# Patient Record
Sex: MALE | Race: WHITE | NOT HISPANIC OR LATINO
[De-identification: names, ages, dates, MRNs, and addresses within clinical notes are randomized per-mention and may not be internally consistent; named-entity substitution may affect disease eponyms.]

---

## 2021-10-26 ENCOUNTER — APPOINTMENT (OUTPATIENT)
Dept: SURGERY | Facility: CLINIC | Age: 51
End: 2021-10-26
Payer: MEDICARE

## 2021-10-26 VITALS — WEIGHT: 166 LBS

## 2021-10-26 DIAGNOSIS — I35.1 NONRHEUMATIC AORTIC (VALVE) INSUFFICIENCY: ICD-10-CM

## 2021-10-26 DIAGNOSIS — H33.22 SEROUS RETINAL DETACHMENT, LEFT EYE: ICD-10-CM

## 2021-10-26 DIAGNOSIS — H52.11 MYOPIA, RIGHT EYE: ICD-10-CM

## 2021-10-26 DIAGNOSIS — Z87.2 PERSONAL HISTORY OF DISEASES OF THE SKIN AND SUBCUTANEOUS TISSUE: ICD-10-CM

## 2021-10-26 DIAGNOSIS — Z78.9 OTHER SPECIFIED HEALTH STATUS: ICD-10-CM

## 2021-10-26 PROCEDURE — 99203 OFFICE O/P NEW LOW 30 MIN: CPT

## 2021-10-26 RX ORDER — SODIUM CHLORIDE 0.65 %
0.65 AEROSOL, SPRAY (ML) NASAL
Refills: 0 | Status: ACTIVE | COMMUNITY

## 2021-10-26 RX ORDER — DOCUSATE SODIUM 100 MG/1
100 CAPSULE, LIQUID FILLED ORAL
Refills: 0 | Status: ACTIVE | COMMUNITY

## 2021-10-26 RX ORDER — ADHESIVE TAPE 3"X 2.3 YD
50 MCG TAPE, NON-MEDICATED TOPICAL
Refills: 0 | Status: ACTIVE | COMMUNITY

## 2021-10-26 RX ORDER — MENTHOL
40 GEL (GRAM) TOPICAL
Refills: 0 | Status: ACTIVE | COMMUNITY

## 2021-10-26 RX ORDER — PETROLATUM 76 G/100G
OINTMENT TOPICAL
Refills: 0 | Status: ACTIVE | COMMUNITY

## 2021-10-26 RX ORDER — LORATADINE 10 MG/1
10 TABLET ORAL
Refills: 0 | Status: ACTIVE | COMMUNITY

## 2021-10-26 RX ORDER — MICONAZOLE NITRATE 20 MG/G
2 CREAM TOPICAL
Refills: 0 | Status: ACTIVE | COMMUNITY

## 2021-10-26 RX ORDER — KETOCONAZOLE 20.5 MG/ML
2 SHAMPOO, SUSPENSION TOPICAL
Refills: 0 | Status: ACTIVE | COMMUNITY

## 2021-10-26 RX ORDER — OLOPATADINE HYDROCHLORIDE OPHTHALMIC 2 MG/ML
0.2 SOLUTION OPHTHALMIC
Refills: 0 | Status: ACTIVE | COMMUNITY

## 2021-10-26 RX ORDER — LACTULOSE 10 G/10G
10 SOLUTION ORAL
Refills: 0 | Status: ACTIVE | COMMUNITY

## 2021-10-26 RX ORDER — MONTELUKAST 10 MG/1
10 TABLET, FILM COATED ORAL
Refills: 0 | Status: ACTIVE | COMMUNITY

## 2021-10-26 RX ORDER — SENNOSIDES 8.6 MG TABLETS 8.6 MG/1
8.6 TABLET ORAL
Refills: 0 | Status: ACTIVE | COMMUNITY

## 2021-10-26 RX ORDER — LORAZEPAM 2 MG/1
2 TABLET ORAL
Refills: 0 | Status: ACTIVE | COMMUNITY

## 2021-10-26 NOTE — ASSESSMENT
[FreeTextEntry1] : Family physician: Dr. Crum\par Date: 10/26/2021\par re referral chronically incarcerated right inguinal scrotal hernia\par \par Is a 42-year-old mentally challenged gentleman who was initially evaluated by his primary care physician and noted to have a chronically incarcerated right inguinal hernia with extension into the mid scrotum.  There went a CAT scan of the abdomen pelvis that demonstrated a right inguinal hernia with redundant sigmoid colon that extends into the internal ring.  Patient has been referred here for further evaluation and management.  Patient unable to give history.\par \par Physical examination patient has a chronically incarcerated right inguinal hernia.  It extends into the mid scrotum.  It is not reducible in the supine position.  Left side is difficult to examine given this patient's amount of fat in the suprapubic groin regions.  There is a fair amount of laxity noted in the left groin and a small left inguinal hernia cannot be ruled out.  Scrotum and testicles appear to be within normal limits.\par \par Impression/plan, chronically incarcerated right inguinal scrotal hernia, suspect left inguinal hernia: This is a 50-year-old gentleman that is mentally challenged.  He clearly has an obvious right inguinal scrotal hernia.  Given the fact that he is mentally challenged I think this hernia should be repaired even though I am not convinced he is symptomatic with this hernia.  I suspect he also has a hernia on the contralateral side as well given the amount of laxity noted on examination.  I think this gentleman would clearly benefit from a laparoscopic hernia repair due to the decreased amount of pain as well as being less invasive therefore I will plan to attempt a laparoscopic right inguinal hernia repair as well as left.  If this is not possible during the operation I will convert this gentleman to an open right inguinal hernia repair.  The left side will not be addressed.\par \par The indications alternatives complication of procedure discussed questions answered we will plan obtain written consent the day of surgery.  We will need to get medical clearance prior to surgery. will also need to get consent from his father\par

## 2021-12-07 ENCOUNTER — APPOINTMENT (OUTPATIENT)
Dept: GASTROENTEROLOGY | Facility: CLINIC | Age: 51
End: 2021-12-07
Payer: MEDICARE

## 2021-12-07 VITALS
WEIGHT: 166 LBS | DIASTOLIC BLOOD PRESSURE: 92 MMHG | BODY MASS INDEX: 25.16 KG/M2 | HEART RATE: 88 BPM | HEIGHT: 68 IN | TEMPERATURE: 97.5 F | SYSTOLIC BLOOD PRESSURE: 142 MMHG

## 2021-12-07 DIAGNOSIS — F73 PROFOUND INTELLECTUAL DISABILITIES: ICD-10-CM

## 2021-12-07 DIAGNOSIS — Z00.00 ENCOUNTER FOR GENERAL ADULT MEDICAL EXAMINATION W/OUT ABNORMAL FINDINGS: ICD-10-CM

## 2021-12-07 DIAGNOSIS — G40.909 EPILEPSY, UNSPECIFIED, NOT INTRACTABLE, W/OUT STATUS EPILEPTICUS: ICD-10-CM

## 2021-12-07 DIAGNOSIS — K59.09 OTHER CONSTIPATION: ICD-10-CM

## 2021-12-07 PROCEDURE — 99203 OFFICE O/P NEW LOW 30 MIN: CPT

## 2021-12-07 NOTE — HISTORY OF PRESENT ILLNESS
[FreeTextEntry1] : New patient\par [facility] by [facility]\par \par the question posed to us;  should Amadeo have a diagnositic colonoscopy, prior to repair of an intuinal hernaia\par  he is 51 years old\par i do not have info re family\par \par he is Challenged, and has  significant cognitive deficit, lives in a group home\par \par

## 2021-12-07 NOTE — PHYSICAL EXAM
[Normal Sphincter Tone] : normal sphincter tone [No Rectal Mass] : no rectal mass [Internal Hemorrhoid] : no internal hemorrhoids [External Hemorrhoid] : no external hemorrhoids [Occult Blood Positive] : stool was negative for occult blood [FreeTextEntry1] : fjirm, heme negative stool,  sent for FIT test

## 2021-12-07 NOTE — ASSESSMENT
[FreeTextEntry1] : Amadeo has been sent for colonoscopy by his Group home\par \par I have spoken to his Nurse, Radhames Gastelum, [phone]\par \par we have decided to proceed with a colon cancer screening test, the FIT test\par \par i am very concerned that the bowel prep will be very difficult and upsetting for Amadeo\par \par we can revisit the idea of colonoscopy in the future..\par if the FIT test is negative, we can repeat it every two years

## 2021-12-08 LAB — HEMOCCULT STL QL IA: NEGATIVE

## 2021-12-20 ENCOUNTER — APPOINTMENT (OUTPATIENT)
Dept: SURGERY | Facility: HOSPITAL | Age: 51
End: 2021-12-20

## 2021-12-31 ENCOUNTER — RESULT REVIEW (OUTPATIENT)
Age: 51
End: 2021-12-31

## 2022-01-03 ENCOUNTER — APPOINTMENT (OUTPATIENT)
Dept: SURGERY | Facility: HOSPITAL | Age: 52
End: 2022-01-03

## 2022-01-18 ENCOUNTER — APPOINTMENT (OUTPATIENT)
Dept: SURGERY | Facility: CLINIC | Age: 52
End: 2022-01-18
Payer: MEDICARE

## 2022-01-18 DIAGNOSIS — K40.30 UNILATERAL INGUINAL HERNIA, WITH OBSTRUCTION, W/OUT GANGRENE, NOT SPECIFIED AS RECURRENT: ICD-10-CM

## 2022-01-18 DIAGNOSIS — K40.90 UNILATERAL INGUINAL HERNIA, W/OUT OBSTRUCTION OR GANGRENE, NOT SPECIFIED AS RECURRENT: ICD-10-CM

## 2022-01-18 PROCEDURE — 99024 POSTOP FOLLOW-UP VISIT: CPT

## 2022-01-18 NOTE — ASSESSMENT
[FreeTextEntry1] : Patient is here today for follow-up after bilateral lap inguinal hernia repair.  Patient without complaints at this time.  (Patient is mentally challenged).  According to the caregiver with the patient today she states he is doing extremely well.  She sees no problems at this point.\par \par Physical examination: Patient examined in supine position.  The incisions of healed nicely there is no evidence of infection.  Scrotum and testicles within normal limits he has no obvious hematoma seroma.  He has no recurrence.\par \par Status post bilateral lap inguinal herniorrhaphy patient doing well surgically no acute findings.  Patient can return to his normal level of activities.  Patient will follow-up with me on a as needed basis

## 2023-12-18 ENCOUNTER — APPOINTMENT (OUTPATIENT)
Dept: GASTROENTEROLOGY | Facility: CLINIC | Age: 53
End: 2023-12-18
Payer: MEDICARE

## 2023-12-18 VITALS
HEART RATE: 90 BPM | DIASTOLIC BLOOD PRESSURE: 82 MMHG | WEIGHT: 154 LBS | BODY MASS INDEX: 23.42 KG/M2 | OXYGEN SATURATION: 99 % | SYSTOLIC BLOOD PRESSURE: 118 MMHG

## 2023-12-18 DIAGNOSIS — Z12.11 ENCOUNTER FOR SCREENING FOR MALIGNANT NEOPLASM OF COLON: ICD-10-CM

## 2023-12-18 PROCEDURE — 99214 OFFICE O/P EST MOD 30 MIN: CPT

## 2023-12-18 NOTE — ASSESSMENT
[FreeTextEntry1] : Rectal exam performed today and was negative for mass Stool: Guaiac negative Set for a fit test which I have ordered. If the fit test is negative I will have him come back in 1 year for reconsideration and rectal exam again.  More than 50% of the face to face time was devoted to counseling and /or coordination of care.  THis coordination of care may have included reviewing other medical notes and reports, and communicating with other health professionals

## 2023-12-18 NOTE — HISTORY OF PRESENT ILLNESS
[FreeTextEntry1] : This is a 53-year-old gentleman, referred from [facility], who is here 2 years ago for consideration of colon cancer screening.  I did a fit test then and it was negative  I wrote in my note that I felt it would be a hardship for the patient to have a colonoscopy, because of his cognitive difficulties, and other medical problems and chronic constipation.  I was concerned then that it would be very difficult and troubling and disturbing for the patient to try to do a bowel preparation

## 2023-12-22 LAB — HEMOCCULT STL QL IA: NEGATIVE

## 2024-12-16 ENCOUNTER — APPOINTMENT (OUTPATIENT)
Dept: GASTROENTEROLOGY | Facility: CLINIC | Age: 54
End: 2024-12-16

## 2025-02-18 ENCOUNTER — APPOINTMENT (OUTPATIENT)
Dept: GASTROENTEROLOGY | Facility: CLINIC | Age: 55
End: 2025-02-18
Payer: MEDICARE

## 2025-02-18 VITALS
HEIGHT: 68 IN | SYSTOLIC BLOOD PRESSURE: 120 MMHG | WEIGHT: 154 LBS | HEART RATE: 83 BPM | OXYGEN SATURATION: 98 % | BODY MASS INDEX: 23.34 KG/M2 | DIASTOLIC BLOOD PRESSURE: 80 MMHG

## 2025-02-18 DIAGNOSIS — K59.09 OTHER CONSTIPATION: ICD-10-CM

## 2025-02-18 DIAGNOSIS — F73 PROFOUND INTELLECTUAL DISABILITIES: ICD-10-CM

## 2025-02-18 DIAGNOSIS — Z12.11 ENCOUNTER FOR SCREENING FOR MALIGNANT NEOPLASM OF COLON: ICD-10-CM

## 2025-02-18 PROCEDURE — 99214 OFFICE O/P EST MOD 30 MIN: CPT

## 2025-02-19 LAB — HEMOCCULT STL QL IA: NEGATIVE
